# Patient Record
Sex: MALE | URBAN - METROPOLITAN AREA
[De-identification: names, ages, dates, MRNs, and addresses within clinical notes are randomized per-mention and may not be internally consistent; named-entity substitution may affect disease eponyms.]

---

## 2023-09-14 ENCOUNTER — APPOINTMENT (RX ONLY)
Dept: URBAN - METROPOLITAN AREA CLINIC 103 | Facility: CLINIC | Age: 63
Setting detail: DERMATOLOGY
End: 2023-09-14

## 2023-09-14 DIAGNOSIS — L98.8 OTHER SPECIFIED DISORDERS OF THE SKIN AND SUBCUTANEOUS TISSUE: ICD-10-CM

## 2023-09-14 PROBLEM — Z71.9 COUNSELING, UNSPECIFIED: Status: ACTIVE | Noted: 2023-09-14

## 2023-09-14 PROCEDURE — ? ADDITIONAL NOTES

## 2023-09-14 PROCEDURE — 99202 OFFICE O/P NEW SF 15 MIN: CPT

## 2023-09-14 PROCEDURE — ? COUNSELING

## 2023-09-14 NOTE — PROCEDURE: ADDITIONAL NOTES
Render Risk Assessment In Note?: no
Detail Level: Simple
Additional Notes: Patient complains of swelling, tingling and inflammation internally on bilateral malar and area adjacent to nose. On exam, no erythema, warmth, swelling, discoloration, or discharge was noted on exam. Patient palpates and points at his maxillary sinuses.  Patient also admit it feels that is underneath the skin versus on the skin. Instructed patient to follow up with PCP who may initiate a physical examination and order lab/imaging studies. Patient states they will follow up with PCP about concern. Patient will contact office if they have further questions, concerns, or new or worsening symptoms.
Additional Notes: Discussed possible causes of inflammation. No signs of inflammation on cheeks, eyes, or nose was noted during exam. Instructed patient to continue to monitor symptoms and follow up with PCP for further evaluation and imaging studies of sinuses which may include Ultrasound or CT Scan of sinuses and possible ENT referral. Patient agreed to follow up with PCP. Informed patient they can contact office with any questions, concerns, or new/worsening symptoms.

## 2023-09-14 NOTE — PROCEDURE: COUNSELING
Detail Level: Detailed
Patient Specific Counseling (Will Not Stick From Patient To Patient): Follow up with PCP for further imaging studies and laboratory work

## 2024-09-24 NOTE — PROCEDURE: MIPS QUALITY
Quality 226: Preventive Care And Screening: Tobacco Use: Screening And Cessation Intervention: Patient screened for tobacco use and is an ex/non-smoker
Quality 130: Documentation Of Current Medications In The Medical Record: Current Medications Documented
Detail Level: Detailed
Quality 431: Preventive Care And Screening: Unhealthy Alcohol Use - Screening: Patient not identified as an unhealthy alcohol user when screened for unhealthy alcohol use using a systematic screening method
show